# Patient Record
Sex: MALE | Race: BLACK OR AFRICAN AMERICAN | URBAN - METROPOLITAN AREA
[De-identification: names, ages, dates, MRNs, and addresses within clinical notes are randomized per-mention and may not be internally consistent; named-entity substitution may affect disease eponyms.]

---

## 2017-03-08 ENCOUNTER — OFFICE VISIT (OUTPATIENT)
Dept: FAMILY MEDICINE CLINIC | Age: 22
End: 2017-03-08

## 2017-03-08 VITALS
HEART RATE: 78 BPM | HEIGHT: 71 IN | OXYGEN SATURATION: 99 % | RESPIRATION RATE: 18 BRPM | TEMPERATURE: 98.2 F | DIASTOLIC BLOOD PRESSURE: 70 MMHG | BODY MASS INDEX: 21.57 KG/M2 | WEIGHT: 154.1 LBS | SYSTOLIC BLOOD PRESSURE: 118 MMHG

## 2017-03-08 DIAGNOSIS — M25.561 ACUTE PAIN OF RIGHT KNEE: Primary | ICD-10-CM

## 2017-03-08 DIAGNOSIS — S83.411A SPRAIN OF MEDIAL COLLATERAL LIGAMENT OF RIGHT KNEE, INITIAL ENCOUNTER: ICD-10-CM

## 2017-03-08 RX ORDER — DICLOFENAC SODIUM 20 MG/G
2 SOLUTION TOPICAL
Qty: 1 BOTTLE | Refills: 5 | Status: SHIPPED | OUTPATIENT
Start: 2017-03-08 | End: 2018-10-03 | Stop reason: ALTCHOICE

## 2017-03-08 NOTE — PATIENT INSTRUCTIONS
Knee Pain or Injury: Care Instructions  Your Care Instructions    Injuries are a common cause of knee problems. Sudden (acute) injuries may be caused by a direct blow to the knee. They can also be caused by abnormal twisting, bending, or falling on the knee. Pain, bruising, or swelling may be severe, and may start within minutes of the injury. Overuse is another cause of knee pain. Other causes are climbing stairs, kneeling, and other activities that use the knee. Everyday wear and tear, especially as you get older, also can cause knee pain. Rest, along with home treatment, often relieves pain and allows your knee to heal. If you have a serious knee injury, you may need tests and treatment. Follow-up care is a key part of your treatment and safety. Be sure to make and go to all appointments, and call your doctor if you are having problems. It's also a good idea to know your test results and keep a list of the medicines you take. How can you care for yourself at home? · Be safe with medicines. Read and follow all instructions on the label. ¨ If the doctor gave you a prescription medicine for pain, take it as prescribed. ¨ If you are not taking a prescription pain medicine, ask your doctor if you can take an over-the-counter medicine. · Rest and protect your knee. Take a break from any activity that may cause pain. · Put ice or a cold pack on your knee for 10 to 20 minutes at a time. Put a thin cloth between the ice and your skin. · Prop up a sore knee on a pillow when you ice it or anytime you sit or lie down for the next 3 days. Try to keep it above the level of your heart. This will help reduce swelling. · If your knee is not swollen, you can put moist heat, a heating pad, or a warm cloth on your knee. · If your doctor recommends an elastic bandage, sleeve, or other type of support for your knee, wear it as directed. · Follow your doctor's instructions about how much weight you can put on your leg.  Use a cane, crutches, or a walker as instructed. · Follow your doctor's instructions about activity during your healing process. If you can do mild exercise, slowly increase your activity. · Reach and stay at a healthy weight. Extra weight can strain the joints, especially the knees and hips, and make the pain worse. Losing even a few pounds may help. When should you call for help? Call 911 anytime you think you may need emergency care. For example, call if:  · You have symptoms of a blood clot in your lung (called a pulmonary embolism). These may include:  ¨ Sudden chest pain. ¨ Trouble breathing. ¨ Coughing up blood. Call your doctor now or seek immediate medical care if:  · You have severe or increasing pain. · Your leg or foot turns cold or changes color. · You cannot stand or put weight on your knee. · Your knee looks twisted or bent out of shape. · You cannot move your knee. · You have signs of infection, such as:  ¨ Increased pain, swelling, warmth, or redness. ¨ Red streaks leading from the knee. ¨ Pus draining from a place on your knee. ¨ A fever. · You have signs of a blood clot in your leg (called a deep vein thrombosis), such as:  ¨ Pain in your calf, back of the knee, thigh, or groin. ¨ Redness and swelling in your leg or groin. Watch closely for changes in your health, and be sure to contact your doctor if:  · You have tingling, weakness, or numbness in your knee. · You have any new symptoms, such as swelling. · You have bruises from a knee injury that last longer than 2 weeks. · You do not get better as expected. Where can you learn more? Go to http://rafal-rossy.info/. Enter K195 in the search box to learn more about \"Knee Pain or Injury: Care Instructions. \"  Current as of: May 27, 2016  Content Version: 11.1  © 8078-7080 [employer].  Care instructions adapted under license by [employer] (which disclaims liability or warranty for this information). If you have questions about a medical condition or this instruction, always ask your healthcare professional. [place] [address] any warranty or liability for your use of this information.

## 2017-03-08 NOTE — MR AVS SNAPSHOT
Visit Information Date & Time Provider Department Dept. Phone Encounter #  
 3/8/2017  2:45 PM Daisy Desirae, [address] [phone] [id number] Follow-up Instructions Return if symptoms worsen or fail to improve. Upcoming Health Maintenance Date Due  
 HPV AGE 9Y-34Y (1 of 3 - Male 3 Dose Series) 1/3/2007 DTaP/Tdap/Td series (1 - Tdap) 1/3/2017 Allergies as of 3/8/2017  Review Complete On: 3/8/2017 By: Daisy Merrill, DO No Known Allergies Current Immunizations  Never Reviewed No immunizations on file. Not reviewed this visit You Were Diagnosed With   
  
 Codes Comments Acute pain of right knee    -  Primary ICD-10-CM: M25.561 ICD-9-CM: 719.46 Sprain of medial collateral ligament of right knee, initial encounter     ICD-10-CM: T95.286R ICD-9-CM: 368. 1 Vitals BP Pulse Temp Resp Height(growth percentile) Weight(growth percentile) 118/70 78 98.2 °F (36.8 °C) (Oral) 18 5' 10.7\" (1.796 m) 154 lb 1.6 oz (69.9 kg) SpO2 BMI Smoking Status 99% 21.68 kg/m2 Never Smoker Vitals History BMI and BSA Data Body Mass Index Body Surface Area  
 21.68 kg/m 2 1.87 m 2 Preferred Pharmacy Pharmacy Name Phone [facility] [phone] Your Updated Medication List  
  
   
This list is accurate as of: 3/8/17  3:28 PM.  Always use your most recent med list.  
  
  
  
  
 cetirizine 10 mg tablet Commonly known as:  ZYRTEC Take 1 Tab by mouth daily. diclofenac sodium 20 mg/gram /actuation(2 %) Sopm  
Commonly known as:  PENNSAID  
2 Pump(s) by Apply Externally route two (2) times daily as needed. loratadine 10 mg tablet Commonly known as:  Na Nay Take 1 Tab by mouth daily. methocarbamol 500 mg tablet Commonly known as:  ROBAXIN Take 1 Tab by mouth four (4) times daily. triamcinolone acetonide 0.1 % topical cream  
Commonly known as:  KENALOG Apply  to affected area two (2) times a day. use thin layer Prescriptions Sent to Pharmacy Refills  
 diclofenac sodium (PENNSAID) 20 mg/gram /actuation(2 %) sopm 5 Si Pump(s) by Apply Externally route two (2) times daily as needed. Class: Normal  
 Pharmacy: [address] [place] [address] Jordana Brito  #: [phone] Route: Apply Externally Follow-up Instructions Return if symptoms worsen or fail to improve. Patient Instructions Knee Pain or Injury: Care Instructions Your Care Instructions Injuries are a common cause of knee problems. Sudden (acute) injuries may be caused by a direct blow to the knee. They can also be caused by abnormal twisting, bending, or falling on the knee. Pain, bruising, or swelling may be severe, and may start within minutes of the injury. Overuse is another cause of knee pain. Other causes are climbing stairs, kneeling, and other activities that use the knee. Everyday wear and tear, especially as you get older, also can cause knee pain. Rest, along with home treatment, often relieves pain and allows your knee to heal. If you have a serious knee injury, you may need tests and treatment. Follow-up care is a key part of your treatment and safety. Be sure to make and go to all appointments, and call your doctor if you are having problems. It's also a good idea to know your test results and keep a list of the medicines you take. How can you care for yourself at home? · Be safe with medicines. Read and follow all instructions on the label. ¨ If the doctor gave you a prescription medicine for pain, take it as prescribed. ¨ If you are not taking a prescription pain medicine, ask your doctor if you can take an over-the-counter medicine. · Rest and protect your knee. Take a break from any activity that may cause pain. · Put ice or a cold pack on your knee for 10 to 20 minutes at a time. Put a thin cloth between the ice and your skin. · Prop up a sore knee on a pillow when you ice it or anytime you sit or lie down for the next 3 days. Try to keep it above the level of your heart. This will help reduce swelling. · If your knee is not swollen, you can put moist heat, a heating pad, or a warm cloth on your knee. · If your doctor recommends an elastic bandage, sleeve, or other type of support for your knee, wear it as directed. · Follow your doctor's instructions about how much weight you can put on your leg. Use a cane, crutches, or a walker as instructed. · Follow your doctor's instructions about activity during your healing process. If you can do mild exercise, slowly increase your activity. · Reach and stay at a healthy weight. Extra weight can strain the joints, especially the knees and hips, and make the pain worse. Losing even a few pounds may help. When should you call for help? Call 911 anytime you think you may need emergency care. For example, call if: 
· You have symptoms of a blood clot in your lung (called a pulmonary embolism). These may include: 
¨ Sudden chest pain. ¨ Trouble breathing. ¨ Coughing up blood. Call your doctor now or seek immediate medical care if: 
· You have severe or increasing pain. · Your leg or foot turns cold or changes color. · You cannot stand or put weight on your knee. · Your knee looks twisted or bent out of shape. · You cannot move your knee. · You have signs of infection, such as: 
¨ Increased pain, swelling, warmth, or redness. ¨ Red streaks leading from the knee. ¨ Pus draining from a place on your knee. ¨ A fever. · You have signs of a blood clot in your leg (called a deep vein thrombosis), such as: 
¨ Pain in your calf, back of the knee, thigh, or groin. ¨ Redness and swelling in your leg or groin. Watch closely for changes in your health, and be sure to contact your doctor if: 
· You have tingling, weakness, or numbness in your knee. · You have any new symptoms, such as swelling. · You have bruises from a knee injury that last longer than 2 weeks. · You do not get better as expected. Where can you learn more? Go to http://rafal-rossy.info/. Enter K195 in the search box to learn more about \"Knee Pain or Injury: Care Instructions. \" Current as of: May 27, 2016 Content Version: 11.1 © 7920-4968 [employer]. Care instructions adapted under license by [employer] (which disclaims liability or warranty for this information). If you have questions about a medical condition or this instruction, always ask your healthcare professional. [address] any warranty or liability for your use of this information. Introducing [facility]! [facility] introduces Relayr patient portal. Now you can access parts of your medical record, email your doctor's office, and request medication refills online. 1. In your internet browser, go to [email or web address] 2. Click on the First Time User? Click Here link in the Sign In box. You will see the New Member Sign Up page. 3. Enter your Relayr Access Code exactly as it appears below. You will not need to use this code after youve completed the sign-up process. If you do not sign up before the expiration date, you must request a new code. · Relayr Access Code: [email or web address] Expires: 6/6/2017  3:28 PM 
 
4. Enter the last four digits of your Social Security Number (xxxx) and Date of Birth (mm/dd/yyyy) as indicated and click Submit. You will be taken to the next sign-up page. 5. Create a Relayr ID. This will be your Relayr login ID and cannot be changed, so think of one that is secure and easy to remember. 6. Create a [employer] password. You can change your password at any time. 7. Enter your Password Reset Question and Answer. This can be used at a later time if you forget your password. 8. Enter your e-mail address. You will receive e-mail notification when new information is available in [address] 9. Click Sign Up. You can now view and download portions of your medical record. 10. Click the Download Summary menu link to download a portable copy of your medical information. If you have questions, please visit the Frequently Asked Questions section of the [employer] website. Remember, [employer] is NOT to be used for urgent needs. For medical emergencies, dial 911. Now available from your iPhone and Android! Please provide this summary of care documentation to your next provider. Your primary care clinician is listed as SYLVAIN GONZALEZ. If you have any questions after today's visit, please call [phone].

## 2017-03-08 NOTE — PROGRESS NOTES
Lena Awad is a 25 y.o. male   Chief Complaint   Patient presents with    Knee Pain     right    pt states that he has been running more and his R knee has been bothering him more. Pt currently training for a marathon. Pt usually runs 3-10 miles 4 days a week. Pt has not taken anything for his knee except wearing a brace which is not helping, ice as well which is not helping. he is a 25y.o. year old male who presents for evalution. Reviewed PmHx, RxHx, FmHx, SocHx, AllgHx and updated and dated in the chart. Review of Systems - negative except as listed above in the HPI    Objective:     Vitals:    03/08/17 1501   BP: 118/70   Pulse: 78   Resp: 18   Temp: 98.2 °F (36.8 °C)   TempSrc: Oral   SpO2: 99%   Weight: 154 lb 1.6 oz (69.9 kg)   Height: 5' 10.7\" (1.796 m)       Current Outpatient Prescriptions   Medication Sig    diclofenac sodium (PENNSAID) 20 mg/gram /actuation(2 %) sopm 2 Pump(s) by Apply Externally route two (2) times daily as needed.  methocarbamol (ROBAXIN) 500 mg tablet Take 1 Tab by mouth four (4) times daily.  cetirizine (ZYRTEC) 10 mg tablet Take 1 Tab by mouth daily.  triamcinolone acetonide (KENALOG) 0.1 % topical cream Apply  to affected area two (2) times a day. use thin layer    loratadine (CLARITIN) 10 mg tablet Take 1 Tab by mouth daily. No current facility-administered medications for this visit. Physical Examination: General appearance - alert, well appearing, and in no distress  Eyes - pupils equal and reactive, extraocular eye movements intact  Chest - clear to auscultation, no wheezes, rales or rhonchi, symmetric air entry  Heart - normal rate, regular rhythm, normal S1, S2, no murmurs, rubs, clicks or gallops  Musculoskeletal - ttp over medial R knee, pain felt medially with valgus stress, neg ant/post draw, no pain with varus stress      Assessment/ Plan:   Isidra Bryant was seen today for knee pain.     Diagnoses and all orders for this visit:    Acute pain of right knee  -     diclofenac sodium (PENNSAID) 20 mg/gram /actuation(2 %) sopm; 2 Pump(s) by Apply Externally route two (2) times daily as needed. Sprain of medial collateral ligament of right knee, initial encounter  -     diclofenac sodium (PENNSAID) 20 mg/gram /actuation(2 %) sopm; 2 Pump(s) by Apply Externally route two (2) times daily as needed. given amount of running likely overuse injury, use pennsaid, cold/hot compresses prn and wear brace for stability  Follow-up Disposition:  Return if symptoms worsen or fail to improve. I have discussed the diagnosis with the patient and the intended plan as seen in the above orders. The patient has received an after-visit summary and questions were answered concerning future plans. Pt conveyed understanding of plan.     Medication Side Effects and Warnings were discussed with patient      Guanako Oh DO

## 2018-09-28 ENCOUNTER — OFFICE VISIT (OUTPATIENT)
Dept: FAMILY MEDICINE CLINIC | Age: 23
End: 2018-09-28

## 2018-09-28 VITALS
WEIGHT: 162 LBS | TEMPERATURE: 98.8 F | SYSTOLIC BLOOD PRESSURE: 121 MMHG | HEIGHT: 71 IN | OXYGEN SATURATION: 98 % | HEART RATE: 81 BPM | RESPIRATION RATE: 16 BRPM | BODY MASS INDEX: 22.68 KG/M2 | DIASTOLIC BLOOD PRESSURE: 82 MMHG

## 2018-09-28 DIAGNOSIS — R51.9 ACUTE NONINTRACTABLE HEADACHE, UNSPECIFIED HEADACHE TYPE: ICD-10-CM

## 2018-09-28 DIAGNOSIS — M54.2 ACUTE NECK PAIN: ICD-10-CM

## 2018-09-28 DIAGNOSIS — M54.50 ACUTE MIDLINE LOW BACK PAIN WITHOUT SCIATICA: ICD-10-CM

## 2018-09-28 DIAGNOSIS — S13.4XXA WHIPLASH INJURIES, INITIAL ENCOUNTER: Primary | ICD-10-CM

## 2018-09-28 RX ORDER — DICLOFENAC SODIUM 75 MG/1
TABLET, DELAYED RELEASE ORAL
Refills: 0 | COMMUNITY
Start: 2018-09-24
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
## 2018-09-28 NOTE — PATIENT INSTRUCTIONS
Whiplash: Care Instructions  Your Care Instructions  Whiplash occurs when your head is suddenly forced forward and then snapped backward, as might happen in a car accident or sports injury. This can cause pain and stiffness in your neck. Your head, chest, shoulders, and arms also may hurt. Most whiplash gets better with home care. Your doctor may advise you to take medicine to relieve pain or relax your muscles. He or she may suggest exercise and physical therapy to increase flexibility and relieve pain. You can try wearing a neck (cervical) collar to support your neck. For a while you probably will need to avoid lifting and other activities that can strain the neck. Follow-up care is a key part of your treatment and safety. Be sure to make and go to all appointments, and call your doctor if you are having problems. It's also a good idea to know your test results and keep a list of the medicines you take. How can you care for yourself at home? · Take pain medicines exactly as directed. ¨ If the doctor gave you a prescription medicine for pain, take it as prescribed. ¨ If you are not taking a prescription pain medicine, ask your doctor if you can take an over-the-counter medicine. ¨ Do not take two or more pain medicines at the same time unless the doctor told you to. Many pain medicines have acetaminophen, which is Tylenol. Too much acetaminophen (Tylenol) can be harmful. · You can try using a soft foam collar to support your neck for short periods of time. You can buy one at most drugstores. Do not wear the collar more than 2 or 3 days unless your doctor tells you to. · You can try using heat and ice to see if it helps. ¨ Try using a heating pad on a low or medium setting for 15 to 20 minutes every 2 to 3 hours. Try a warm shower in place of one session with the heating pad. You can also buy single-use heat wraps that last up to 8 hours.   ¨ You can also try an ice pack for 10 to 15 minutes every 2 to 3 hours.  · Do not do anything that makes the pain worse. Take it easy for a couple of days. You can do your usual activities if they do not hurt your neck or put it at risk for more stress or injury. Avoid lifting, sports, or other activities that might strain your neck. · Try sleeping on a special neck pillow. Place it under your neck, not under your head. Placing a tightly rolled-up towel under your neck while you sleep will also work. If you use a neck pillow or rolled towel, do not use your regular pillow at the same time. · Once your neck pain is gone, do exercises to stretch your neck and back and make them stronger. Your doctor or physical therapist can tell you which exercises are best.  When should you call for help? Call 911 anytime you think you may need emergency care. For example, call if:    · You are unable to move an arm or a leg at all.   [facility] your doctor now or seek immediate medical care if:    · You have new or worse symptoms in your arms, legs, chest, belly, or buttocks. Symptoms may include:  ¨ Numbness or tingling. ¨ Weakness. ¨ Pain.     · You lose bladder or bowel control.    Watch closely for changes in your health, and be sure to contact your doctor if:    · You are not getting better as expected. Where can you learn more? Go to http://rafal-rossy.info/. Enter Z022 in the search box to learn more about \"Whiplash: Care Instructions. \"  Current as of: November 29, 2017  Content Version: 11.7  © 7877-5286 [employer]ezim.[employer], [employer]orporated. Care instructions adapted under license by [employer] (which disclaims liability or warranty for this information). If you have questions about a medical condition or this instruction, always ask your healthcare professional. [address] any warranty or liability for your use of this information.
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
## 2018-09-28 NOTE — LETTER
9/28/2018 3:41 PM 
 
Mr. Tana Kent [place] 
[address] [address] Please excuse Mr. Parveen Morris from work this week due to illness. Sincerely, Johnny Ren NP

## 2018-10-03 ENCOUNTER — OFFICE VISIT (OUTPATIENT)
Dept: FAMILY MEDICINE CLINIC | Age: 23
End: 2018-10-03

## 2018-10-03 VITALS
DIASTOLIC BLOOD PRESSURE: 70 MMHG | OXYGEN SATURATION: 99 % | BODY MASS INDEX: 22.82 KG/M2 | HEART RATE: 76 BPM | HEIGHT: 71 IN | SYSTOLIC BLOOD PRESSURE: 105 MMHG | TEMPERATURE: 98.2 F | WEIGHT: 163 LBS | RESPIRATION RATE: 16 BRPM

## 2018-10-03 DIAGNOSIS — M62.838 TRAPEZIUS MUSCLE SPASM: Primary | ICD-10-CM

## 2018-10-03 DIAGNOSIS — M54.50 CHRONIC BILATERAL LOW BACK PAIN WITHOUT SCIATICA: ICD-10-CM

## 2018-10-03 DIAGNOSIS — G89.29 CHRONIC BILATERAL LOW BACK PAIN WITHOUT SCIATICA: ICD-10-CM
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
## 2018-10-03 NOTE — MR AVS SNAPSHOT
[address] [address] 
[phone] Patient: Emil Awad MRN: [id number] :1995 Visit Information Date & Time Provider Department Dept. Phone Encounter #  
 10/3/2018  7:15 AM Augusto Wolf, [address] [phone] [id number] Upcoming Health Maintenance Date Due DTaP/Tdap/Td series (1 - Tdap) 1/3/2017 Influenza Age 5 to Adult 2018 Allergies as of 10/3/2018  Review Complete On: 10/3/2018 By: Joss Howell LPN No Known Allergies Current Immunizations  Never Reviewed No immunizations on file. Not reviewed this visit You Were Diagnosed With   
  
 Codes Comments Trapezius muscle spasm    -  Primary ICD-10-CM: X77.398 ICD-9-CM: 728.85 Chronic bilateral low back pain without sciatica     ICD-10-CM: M54.5, G89.29 ICD-9-CM: 724.2, 338.29 Vitals BP Pulse Temp Resp Height(growth percentile) Weight(growth percentile) 105/70 (BP 1 Location: Left arm, BP Patient Position: Sitting) 76 98.2 °F (36.8 °C) (Oral) 16 5' 10.7\" (1.796 m) 163 lb (73.9 kg) SpO2 BMI Smoking Status 99% 22.93 kg/m2 Never Smoker Vitals History BMI and BSA Data Body Mass Index Body Surface Area  
 22.93 kg/m 2 1.92 m 2 Preferred Pharmacy [facility]acy Name Phone [id number] [address] [phone] Your Updated Medication List  
  
   
This list is accurate as of 10/3/18  7:47 AM.  Always use your most recent med list.  
  
  
  
  
 diclofenac EC 75 mg EC tablet Commonly known as:  VOLTAREN  
TAKE 1 TABLET BY MOUTH TWICE A DAY  
  
 methocarbamol 500 mg tablet Commonly known as:  ROBAXIN Take 1 Tab by mouth four (4) times daily. We Performed the Following REFERRAL TO PHYSICAL THERAPY [WMT71 Custom] Comments: Please evaluate patient for upper trapezius spasms shannan and low back spasm. Referral Information Referral ID Referred By Referred To  
  
 [id number], PT   
   [address]   
   [place], [address] [place] [address] Phone: [phone] Fax: [phone] Visits Status Start Date End Date 1 New Request 10/3/18 10/3/19 If your referral has a status of pending review or denied, additional information will be sent to support the outcome of this decision. Introducing [facility] & HEALTH SERVICES! Dear Bessy Bergman: Thank you for requesting a Billowby account. Our records indicate that you already have an active Billowby account. You can access your account anytime at [email or web address] Did you know that you can access your hospital and ER discharge instructions at any time in Billowby? You can also review all of your test results from your hospital stay or ER visit. Additional Information If you have questions, please visit the Frequently Asked Questions section of the Billowby website at [email or web address] Remember, Billowby is NOT to be used for urgent needs. For medical emergencies, dial 911. Now available from your iPhone and Android! Please provide this summary of care documentation to your next provider. Your primary care clinician is listed as SYLVAIN GONZALEZ. If you have any questions after today's visit, please call [phone].

## 2019-01-08 ENCOUNTER — OFFICE VISIT (OUTPATIENT)
Dept: FAMILY MEDICINE CLINIC | Age: 24
End: 2019-01-08

## 2019-01-08 VITALS
OXYGEN SATURATION: 100 % | HEIGHT: 70 IN | WEIGHT: 173 LBS | DIASTOLIC BLOOD PRESSURE: 75 MMHG | RESPIRATION RATE: 18 BRPM | SYSTOLIC BLOOD PRESSURE: 139 MMHG | BODY MASS INDEX: 24.77 KG/M2 | TEMPERATURE: 98.5 F | HEART RATE: 62 BPM

## 2019-01-08 DIAGNOSIS — S46.811A TRAPEZIUS MUSCLE STRAIN, RIGHT, INITIAL ENCOUNTER: Primary | ICD-10-CM

## 2019-01-08 RX ORDER — METHOCARBAMOL 500 MG/1
500 TABLET, FILM COATED ORAL 3 TIMES DAILY
Qty: 60 TAB | Refills: 0 | Status: SHIPPED | OUTPATIENT
Start: 2019-01-08
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
## 2019-01-29 ENCOUNTER — DOCUMENTATION ONLY (OUTPATIENT)
Dept: FAMILY MEDICINE CLINIC | Age: 24
End: 2019-01-29

## 2019-01-29 NOTE — PROGRESS NOTES
[phone]

## 2023-01-28 ENCOUNTER — HOSPITAL ENCOUNTER (EMERGENCY)
Facility: HOSPITAL | Age: 28
Discharge: HOME | End: 2023-01-28
Attending: EMERGENCY MEDICINE

## 2023-01-28 VITALS
DIASTOLIC BLOOD PRESSURE: 69 MMHG | HEART RATE: 90 BPM | TEMPERATURE: 98.4 F | SYSTOLIC BLOOD PRESSURE: 130 MMHG | RESPIRATION RATE: 18 BRPM | OXYGEN SATURATION: 99 %

## 2023-01-28 DIAGNOSIS — S81.811A LACERATION OF RIGHT LOWER LEG, INITIAL ENCOUNTER: Primary | ICD-10-CM

## 2023-01-28 RX ORDER — LIDOCAINE HYDROCHLORIDE AND EPINEPHRINE 10; 10 MG/ML; UG/ML
20 INJECTION, SOLUTION INFILTRATION; PERINEURAL ONCE
Status: COMPLETED | OUTPATIENT
Start: 2023-01-28 | End: 2023-01-28

## 2023-01-28 RX ORDER — GINSENG 100 MG
1 CAPSULE ORAL ONCE
Status: COMPLETED | OUTPATIENT
Start: 2023-01-28 | End: 2023-01-28

## 2023-01-28 RX ADMIN — LIDOCAINE HYDROCHLORIDE,EPINEPHRINE BITARTRATE 20 ML: 10; .01 INJECTION, SOLUTION INFILTRATION; PERINEURAL at 16:34

## 2023-01-28 RX ADMIN — BACITRACIN ZINC 1 LARGE APPLICATION: 500 OINTMENT TOPICAL at 18:04

## 2023-01-28 NOTE — ED PROVIDER NOTES
History  Chief Complaint   Patient presents with   • Laceration     Pt reports laceration below right knee from snow boarding injuring today  Pt denies hitting head, LOC, or Bts  Patient is 26-year-old male presents with laceration to right lower leg  Patient states that he was snowboarding and someone else's board hit his leg  He denies loss of consciousness or hitting head  None       History reviewed  No pertinent past medical history  History reviewed  No pertinent surgical history  History reviewed  No pertinent family history  I have reviewed and agree with the history as documented  E-Cigarette/Vaping   • E-Cigarette Use Never User      E-Cigarette/Vaping Substances     Social History     Tobacco Use   • Smoking status: Never   • Smokeless tobacco: Never   Vaping Use   • Vaping Use: Never used   Substance Use Topics   • Alcohol use: Never   • Drug use: Never       Review of Systems   Musculoskeletal: Negative for gait problem and joint swelling  Skin: Positive for wound  Neurological: Negative for syncope and headaches  Physical Exam  Physical Exam  Constitutional:       General: He is not in acute distress  Appearance: He is normal weight  HENT:      Head: Normocephalic and atraumatic  Eyes:      Extraocular Movements: Extraocular movements intact  Skin:     Findings: Signs of injury and laceration present  Comments: 4 to 5 cm laceration on anterior right lower leg  Neurological:      Mental Status: He is alert and oriented to person, place, and time     Psychiatric:         Mood and Affect: Mood normal          Behavior: Behavior normal          Vital Signs  ED Triage Vitals [01/28/23 1622]   Temperature Pulse Respirations Blood Pressure SpO2   98 4 °F (36 9 °C) 90 18 130/69 99 %      Temp Source Heart Rate Source Patient Position - Orthostatic VS BP Location FiO2 (%)   Oral Monitor Sitting Left arm --      Pain Score       --           Vitals:    01/28/23 1622   BP: 130/69   Pulse: 90   Patient Position - Orthostatic VS: Sitting         Visual Acuity      ED Medications  Medications   lidocaine-epinephrine (XYLOCAINE/EPINEPHRINE) 1 %-1:100,000 injection 20 mL (20 mL Infiltration Given 1/28/23 1634)   bacitracin topical ointment 1 large application (1 large application Topical Given 1/28/23 1804)       Diagnostic Studies  Results Reviewed     None                 No orders to display              Procedures  Laceration repair    Date/Time: 1/28/2023 5:40 PM  Performed by: Elie Meyers PA-C  Authorized by: Elie Meyers PA-C   Consent: Verbal consent obtained  Risks and benefits: risks, benefits and alternatives were discussed  Consent given by: patient  Patient understanding: patient states understanding of the procedure being performed  Patient consent: the patient's understanding of the procedure matches consent given  Procedure consent: procedure consent matches procedure scheduled  Relevant documents: relevant documents present and verified  Site marked: the operative site was marked  Patient identity confirmed: verbally with patient  Time out: Immediately prior to procedure a "time out" was called to verify the correct patient, procedure, equipment, support staff and site/side marked as required  Body area: lower extremity  Location details: right lower leg  Laceration length: 5 cm  Foreign bodies: no foreign bodies  Tendon involvement: none  Nerve involvement: none  Vascular damage: no  Anesthesia: local infiltration    Anesthesia:  Local Anesthetic: lidocaine 1% with epinephrine  Anesthetic total: 10 mL    Sedation:  Patient sedated: no      Wound Dehiscence:  Superficial Wound Dehiscence: simple closure      Procedure Details:  Preparation: Patient was prepped and draped in the usual sterile fashion    Irrigation solution: saline  Irrigation method: syringe  Amount of cleaning: standard  Skin closure: Ethilon  Number of sutures: 7  Technique: horizontal mattress and simple  Approximation: loose  Approximation difficulty: simple  Dressing: 4x4 sterile gauze, antibiotic ointment and gauze roll  Patient tolerance: patient tolerated the procedure well with no immediate complications               ED Course                                             Medical Decision Making  Patient is 72-year-old male who presents with approximately 5 cm laceration to the right lower leg  Placed horizontal mattress sutures and 3 simple sutures  Patient should keep it covered if doing other activities  If wound is to open or any sutures rip, he should keep the area covered and follow-up with primary urgent care  He should have the sutures removed in 14 days  If he develops fever, chills, pain or discharge from the wound, you should return to the ER or urgent care  Risk  OTC drugs  Prescription drug management  Disposition  Final diagnoses:   Laceration of right lower leg, initial encounter     Time reflects when diagnosis was documented in both MDM as applicable and the Disposition within this note     Time User Action Codes Description Comment    1/28/2023  6:10 PM Javier Calderón Add [[id number]] Laceration of right lower leg, initial encounter       ED Disposition     ED Disposition   Discharge    Condition   Stable    Date/Time   Sat Jan 28, 2023  6:10 PM    Comment   Raúl Reid discharge to home/self care  Follow-up Information     Follow up With Specialties Details Why Contact Info Additional [facility] Internal Medicine Schedule an appointment as soon as possible for a visit in 15 days For suture removal [address] [facility] Internal Medicine [facility], [address] [place] [address], [id number] [phone]          Patient's Medications    No medications on file       No discharge procedures on file      PDMP Review     None          ED Provider  Electronically Signed by           Sharon Lambert PA-C  01/28/23 7038